# Patient Record
Sex: FEMALE | Race: BLACK OR AFRICAN AMERICAN | NOT HISPANIC OR LATINO | Employment: UNEMPLOYED | RURAL
[De-identification: names, ages, dates, MRNs, and addresses within clinical notes are randomized per-mention and may not be internally consistent; named-entity substitution may affect disease eponyms.]

---

## 2017-12-13 ENCOUNTER — HISTORICAL (OUTPATIENT)
Dept: ADMINISTRATIVE | Facility: HOSPITAL | Age: 57
End: 2017-12-13

## 2017-12-14 LAB
LAB AP CLINICAL INFORMATION: NORMAL
LAB AP DIAGNOSIS - HISTORICAL: NORMAL
LAB AP GROSS PATHOLOGY - HISTORICAL: NORMAL
LAB AP SPECIMEN SUBMITTED - HISTORICAL: NORMAL

## 2018-01-09 ENCOUNTER — HISTORICAL (OUTPATIENT)
Dept: ADMINISTRATIVE | Facility: HOSPITAL | Age: 58
End: 2018-01-09

## 2020-06-02 ENCOUNTER — HISTORICAL (OUTPATIENT)
Dept: ADMINISTRATIVE | Facility: HOSPITAL | Age: 60
End: 2020-06-02

## 2020-06-05 LAB
LAB AP CLINICAL INFORMATION: NORMAL
LAB AP GENERAL CAT - HISTORICAL: NORMAL
LAB AP INTERPRETATION/RESULT - HISTORICAL: NEGATIVE
LAB AP SPECIMEN ADEQUACY - HISTORICAL: NORMAL
LAB AP SPECIMEN SUBMITTED - HISTORICAL: NORMAL

## 2022-02-24 DIAGNOSIS — M25.512 LEFT SHOULDER PAIN: Primary | ICD-10-CM

## 2022-02-28 ENCOUNTER — CLINICAL SUPPORT (OUTPATIENT)
Dept: REHABILITATION | Facility: HOSPITAL | Age: 62
End: 2022-02-28
Payer: COMMERCIAL

## 2022-02-28 DIAGNOSIS — M25.612 DECREASED RANGE OF MOTION OF LEFT SHOULDER: ICD-10-CM

## 2022-02-28 DIAGNOSIS — M25.512 LEFT SHOULDER PAIN: ICD-10-CM

## 2022-02-28 PROBLEM — M25.619 DECREASED RANGE OF MOTION (ROM) OF SHOULDER: Status: ACTIVE | Noted: 2022-02-28

## 2022-02-28 NOTE — PLAN OF CARE
Patient: Pamela Nichole   Clinic #: [id number]  Date of evaluation: 2022     Referring Physician: Malina Silva FN*  Diagnosis:   Encounter Diagnoses   Name Primary?    Left shoulder pain     Decreased range of motion of left shoulder      Referral Orders: Eval and Treat  Age: 61 y.o.  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     No current facility-administered medications for this visit.     Review of patient's allergies indicates:  Not on File  X-Rays/Tests: no current xray      Subjective:    Pain: chronic pain with L shoulder  During no work: 3/10  While workin/10  Sleeping: /a/10  Location of pain: L shoulder superior/posteriror shoulder     Iris's goals for therapy are: return to PLOF, pain free ROM; improve function.      Objective:  Sensation Test: occasional numberness; hypersensitive    Observation/Inspection   Left Right   Anatomic Symmetry normal normal   Bony normal normal   Suparspinatus atrophic normal   Infraspinatus normal normal   Rhomboid atrophic atrophic     Observation/Inspection:  scapular winging      Range of Motion:   Right: WNL  Left: neither limited as follows: (see measurements table below) nor WFL but is limited by arthritis     (L) UE (R) UE     AROM AROM Norm   Shoulder Flexion   0-180   Shoulder Flexion 133    Shoulder Abduction 125 w/ P! @ 3/10 WFL 0-180   Shoulder Extension 45 w/ P! @ 5/10 WFL 0-50   Shoulder Internal Rotation 80 w/ 6/10 WFL 0-90   Shoulder External Rotation 73 w/ P!! 8/10 WFL 0-90   Horz Shoulder Adduction NT WFL 0-40     ROM Comments:   Pt has pain with shoulder abduction and external  rotation    Painful Arc:   Patient demonstrates painful arc in shoulder flexion or abduction.  Through Abduction: Elevation 90 Degrees      Strength  Shoulder Flexion LUE: 2+/5   Shoulder Extension LUE: 2+/5   Shoulder Abduction LUE:  2+/5   Shoulder Adduction LUE:  2+/5   Internal Rotation LUE: 2+/5   External Rotation LUE: 2+/5     43# on L hand strength  50# on R hand    Special Tests:  Positive: Neer Impingement and Empty can test        Palpation: (for pain)     Positive: Infraspinatus Region, AC joint and Supraspinatus Region         Limitations of Functional Status:   Self Care: reaching overhead  Leisure: carrying things,     Treatment included: OT evaluation, the following exercises (HEP) were instructed and Iris was able to demonstrate them prior to the end of the session. HEP will include stretching and strengthening as the pt progresses         Assessment  This 61 y.o. female referred to Outpatient Occupational Therapy with diagnosis of   Encounter Diagnoses   Name Primary?    Left shoulder pain     Decreased range of motion of left shoulder     presents with limitations as described in problem list. Patient can benefit from Occupational Therapy services for Ultrasound, moist heat, PROM, AAROM, AROM, Theraputic exercises, joint mobs, ice, Theraband Ex, UBE and pulley ex in order to maximize painfree functional use of  left UE. . The following goals were discussed with the patient and she is in agreement with them as to be addressed in the treatment plan.     Problem List:   Decreased function of Left UE, Decreased ROM, Increased pain, Decreased strength, Scapular winging and Inability to perform leisure activiites    Goals to be met in 4 weeks:  1) Pt will be independent and report performing HEP to maximize (L) shoulder functional capacity.  2) Pt will increase shoulder AROM in all measured planes of motion by 10-15 degrees with shoulder flexion and abduction.  3) Pt to increase LUE strength to 3+/5 -  4/5 as demonstrated by ADL and leisure task performance and MMT.  4) Pt will decreased LUE pain from 8/10 with movement to 3/10  5) Pt will increase L hand strength by 2.5# for carrying items  6) Pt to decreased external rotation pain to 2/10 with movement.    Plan  Iris to be treated by Occupational Therapy 2 times per week for 4 weeks to achieve the established goals.   Treatment to include Ultrasoud @ 3mHz, AAROM Mobilization of GH joint, PROM Home program, Ice, Moist heat, Strengthening Theraband Ex, UBE  and E- stim as well as any other treatments deemed necessary based on the patient's needs or progress.         I certify the need for these services furnished under this plan of treatment and while under my care    ____________________________________  Physician/Referring Practitioner    _______________  Date of Signature

## 2022-03-07 ENCOUNTER — CLINICAL SUPPORT (OUTPATIENT)
Dept: REHABILITATION | Facility: HOSPITAL | Age: 62
End: 2022-03-07
Payer: COMMERCIAL

## 2022-03-07 DIAGNOSIS — M25.612 DECREASED RANGE OF MOTION OF LEFT SHOULDER: ICD-10-CM

## 2022-03-07 DIAGNOSIS — M25.512 ACUTE PAIN OF LEFT SHOULDER: ICD-10-CM

## 2022-03-07 PROCEDURE — 97110 THERAPEUTIC EXERCISES: CPT

## 2022-03-07 PROCEDURE — 97530 THERAPEUTIC ACTIVITIES: CPT

## 2022-03-07 NOTE — PROGRESS NOTES
Occupational Therapy Progress Note    Subjective   Pt tolerated skilled OT well and was given therapeutic rest breaks as needed. Pt reported mild pain during her first session however this improved as time progressed.       Objective     Current condition: L shoulder pain  Measurements/Tests: no measurement taken at this time.    Assessment     Summary/Analysis of evaluation: Pt tolerated skilled OT well as therapy progressed. Pt initially was stiff but with ROM and pain improved as therapy progressed. Pt was educated on the importance of scaplohumeral rhythm. Pt session was at a mild to moderate intensity to adhere to pt's pain and tolerance. Overall the pt tolerated this session well and was given rest breaks as needed. Pt reported mild pain but states that her stiffness had improved by the end of the session. Skilled OT should be continued at this time.         Plan     TREATMENT  Therapeutic activity:     Pt [id number] mins on arm ergometer to increase endurance, BUE strength and activity tolerance for ADL performance    Pt completed 10 mins on BUE shoulder pulleys to increase activity tolerance, BUE strength and shoulder ROM to increase functional reaching for ADL performance.    Therapeutic exercise:    While sitting on the edge of the mat the pt completed the follow therapeutic exercises to enhance UB strength/ROM/stability for ADL performance:     bicep curls 2 sets of 20 reps  With 2# DB  external rotation 2 sets to 20 reps with 2#DB (OT's active assistance to end range  Scapular retraction: 1 set of 20 reps with 3 second holds at end range  Shoulder shrugs: 1 set of 20 reps with 3 second holds at end range                  Treatment time: therapeutic activity: 20; therapeutic exercise: 25  PLAN0                   Follow Up  Follow up in: next therapy visit

## 2022-03-09 ENCOUNTER — CLINICAL SUPPORT (OUTPATIENT)
Dept: REHABILITATION | Facility: HOSPITAL | Age: 62
End: 2022-03-09
Payer: COMMERCIAL

## 2022-03-09 DIAGNOSIS — M25.512 ACUTE PAIN OF LEFT SHOULDER: Primary | ICD-10-CM

## 2022-03-09 PROCEDURE — 97140 MANUAL THERAPY 1/> REGIONS: CPT

## 2022-03-09 PROCEDURE — 97530 THERAPEUTIC ACTIVITIES: CPT

## 2022-03-09 PROCEDURE — 97110 THERAPEUTIC EXERCISES: CPT

## 2022-03-09 NOTE — PROGRESS NOTES
Occupational Therapy Progress Note    Subjective   Pt tolerated skilled OT well and was given therapeutic rest breaks as needed. Pt reported mild pain at a 2-3/10 upon arrival   Current condition: L shoulder pain  Measurements/Tests: no measurement taken at this time.    Assessment     Summary/Analysis of evaluation: Pt tolerated skilled OT well as therapy progressed. Pt states her therapy session the other day help relieve pain afterwards. Pt continues to has some lateral shoulder pain which I attribute to joint capsule space. Pt tolerated manual intervention well and shows the motivation to get better. Pt continues to have limited exeternal rotation however the pain lessen when OT provide a medial scapular glide. Pt to continue skilled OT to achieve pain free ROM.         Plan     TREATMENT  Therapeutic activity:     Pt [id number] mins on arm ergometer to increase endurance, BUE strength and activity tolerance for ADL performance    Pt completed 10 mins on BUE shoulder pulleys with emphasis on shoulder flexion and abduction to increase activity tolerance, BUE strength and shoulder ROM to increase functional reaching for ADL performance.    Manual  While sidelying scapular mobilization were applied to the pt's LUE in posterior, superior and inferior direction to increase joint capsule space and joint tightness. Pt was also administered  ER side lying with medial scapular prompts for posture and shoulder stability.    Therapeutic exercise:    While sitting on the edge of the mat the pt completed the follow therapeutic exercises to enhance UB strength/ROM/stability for ADL performance:     bicep curls 2 sets of 20 reps  With 3# DB  external rotation 2 sets to 20 reps with 2#DB (OT's active assistance to end range)  Scapular retraction: 2 set of 20 reps with 3 second holds at end range  Shoulder shrugs: 1 set of 25 reps with 2 second holds at end range                  Treatment time: therapeutic activity: 20;  therapeutic exercise: 15; manual: 10  PLAN0                   Follow Up  Follow up in: next therapy visit

## 2022-03-16 ENCOUNTER — CLINICAL SUPPORT (OUTPATIENT)
Dept: REHABILITATION | Facility: HOSPITAL | Age: 62
End: 2022-03-16
Payer: COMMERCIAL

## 2022-03-16 DIAGNOSIS — M25.512 ACUTE PAIN OF LEFT SHOULDER: Primary | ICD-10-CM

## 2022-03-16 DIAGNOSIS — M25.612 DECREASED RANGE OF MOTION OF LEFT SHOULDER: ICD-10-CM

## 2022-03-16 PROCEDURE — 97530 THERAPEUTIC ACTIVITIES: CPT

## 2022-03-16 PROCEDURE — 97110 THERAPEUTIC EXERCISES: CPT

## 2022-03-16 NOTE — PROGRESS NOTES
Occupational Therapy Progress Note    Subjective   Pt tolerated skilled OT well and was given therapeutic rest breaks as needed. Pt reported mild pain at a 1-2/10 upon arrival   Current condition: L shoulder pain  Measurements/Tests: no measurement taken at this time.    Assessment     Summary/Analysis of evaluation: Pt tolerated skilled OT well as therapy progressed. Pt reports that she has not experienced pain until today. Pt participated well with skilled OT and was given rest breaks as needed. Pt continues to have a good therapy prognosis therefore skilled OT should be continued.     Plan     TREATMENT  Therapeutic activity:     Pt [id number] mins on arm ergometer to increase endurance, BUE strength and activity tolerance for ADL performance    Pt completed 10 mins on BUE shoulder pulleys with 2# DB emphasis on shoulder flexion and abduction to increase activity tolerance, BUE strength and shoulder ROM to increase functional reaching for ADL performance.      Therapeutic exercise:    While sitting on the edge of the mat the pt completed the follow therapeutic exercises to enhance UB strength/ROM/stability for ADL performance:    bicep curls 2 sets of 20 reps  with 3# DB  external rotation 2 sets to 20 reps with 2#DB (OT's active assistance to end range)  Scapular retraction: 2 set of 20 reps with 3 second holds at end range using red theraband  Shoulder shrugs: 2 set of 20 reps with 2 second holds at end range                  Treatment time: therapeutic activity: 20; therapeutic exercise: 25;   PLAN0                   Follow Up  Follow up in: next therapy visit

## 2022-03-18 ENCOUNTER — CLINICAL SUPPORT (OUTPATIENT)
Dept: REHABILITATION | Facility: HOSPITAL | Age: 62
End: 2022-03-18
Payer: COMMERCIAL

## 2022-03-18 DIAGNOSIS — M25.612 DECREASED RANGE OF MOTION OF LEFT SHOULDER: ICD-10-CM

## 2022-03-18 PROCEDURE — 97110 THERAPEUTIC EXERCISES: CPT

## 2022-03-18 PROCEDURE — 97530 THERAPEUTIC ACTIVITIES: CPT

## 2022-03-18 NOTE — PROGRESS NOTES
Occupational Therapy Progress Note    Subjective   Pt tolerated skilled OT well and was given therapeutic rest breaks as needed. Pt reported mild pain at a 1-2/10 upon arrival   Current condition: L shoulder pain  Measurements/Tests: no measurement taken at this time.    Assessment     Summary/Analysis of evaluation: Pt tolerated skilled OT well as therapy progressed. Pt reports experienced mild pain but repotes Pt participated well with skilled OT and was given rest breaks as needed. Pt continues to have a good therapy prognosis therefore skilled OT should be continued.     Plan     TREATMENT  Therapeutic activity:     Pt [id number] mins on arm ergometer to increase endurance, BUE strength and activity tolerance for ADL performance    Pt completed 10 mins on BUE shoulder pulleys with 2# DB emphasis on shoulder flexion and abduction to increase activity tolerance, BUE strength and shoulder ROM to increase functional reaching for ADL performance.      Therapeutic exercise:    While sitting on the edge of the mat the pt completed the follow therapeutic exercises to enhance UB strength/ROM/stability for ADL performance:    bicep curls 2 sets of 20 reps  with 3# DB  external rotation 2 sets to 20 reps with 2#DB (OT's active assistance to end range)  Scapular retraction: 2 set of 20 reps with 3 second holds at end range using red theraband  Shoulder shrugs: 2 set of 20 reps with 2 second holds at end range    Manual:  While sidelying the scapular mobilization were applied to the pt's LUE in an anterior, posterior and inferior direction to increase joint capsule space and joint tightness. Pt was also administered and sidelying ER.                 Treatment time: therapeutic activity: 20; therapeutic exercise: 25;   PLAN0                   Follow Up  Follow up in: next therapy visit

## 2022-03-21 ENCOUNTER — CLINICAL SUPPORT (OUTPATIENT)
Dept: REHABILITATION | Facility: HOSPITAL | Age: 62
End: 2022-03-21
Payer: COMMERCIAL

## 2022-03-21 DIAGNOSIS — M25.512 ACUTE PAIN OF LEFT SHOULDER: Primary | ICD-10-CM

## 2022-03-21 PROCEDURE — 97110 THERAPEUTIC EXERCISES: CPT

## 2022-03-21 PROCEDURE — 97530 THERAPEUTIC ACTIVITIES: CPT

## 2022-03-21 NOTE — PROGRESS NOTES
Occupational Therapy Progress Note    Subjective   Pt tolerated skilled OT well and was given therapeutic rest breaks as needed. Pt reported mild pain at a 1-2/10 with movement   Current condition: L shoulder pain  Measurements/Tests: no measurement taken at this time.    Assessment     Summary/Analysis of evaluation: Pt tolerated skilled OT well as therapy progressed. Pt reports compliance with HEP and reports little to no pain at this time. Pt participated well with skilled OT and was given rest breaks as needed. Pt is progressing well with therapy.      Plan     TREATMENT  Therapeutic activity:     Pt [id number] mins on arm ergometer to increase endurance, BUE strength and activity tolerance for ADL performance    Pt completed 10 mins on BUE shoulder pulleys with 2# DB emphasis on shoulder flexion and abduction to increase activity tolerance, BUE strength and shoulder ROM to increase functional reaching for ADL performance.      Therapeutic exercise:    While sitting on the edge of the mat the pt completed the follow therapeutic exercises to enhance UB strength/ROM/stability for ADL performance:    Shoulder abduction 2 sets of 20 reps  with 1# DB  external rotation 2 sets to 20 reps with 2#DB (OT's active assistance to end range)  Scapular retraction: 2 set of 20 reps with 3 second holds at end range using green theraband  Shoulder shrugs: 2 set of 20 reps with 2 second holds at end range          Treatment time: therapeutic activity: 20; therapeutic exercise: 25;   PLAN0                   Follow Up  Follow up in: next therapy visit

## 2022-03-23 ENCOUNTER — CLINICAL SUPPORT (OUTPATIENT)
Dept: REHABILITATION | Facility: HOSPITAL | Age: 62
End: 2022-03-23
Payer: COMMERCIAL

## 2022-03-23 DIAGNOSIS — M25.512 ACUTE PAIN OF LEFT SHOULDER: Primary | ICD-10-CM

## 2022-03-23 PROCEDURE — 97110 THERAPEUTIC EXERCISES: CPT

## 2022-03-23 PROCEDURE — 97530 THERAPEUTIC ACTIVITIES: CPT

## 2022-03-23 NOTE — PROGRESS NOTES
Occupational Therapy Progress Note    Subjective   Pt tolerated skilled OT well and was given therapeutic rest breaks as needed. Pt reported mild pain at a 0/10 with movement. Pt reports feeling better on today.    Current condition: L shoulder pain    Measurements/Tests: no measurement taken at this time.   (L) UE (R) UE       AROM AROM Norm   Shoulder Flexion     0-180   Shoulder Flexion 133    Shoulder Abduction 125 w/ P! @ 3/10 WFL 0-180   Shoulder Extension 45 w/ P! @ 5/10 WFL 0-50   Shoulder Internal Rotation 80 w/ 6/10 WFL 0-90   Shoulder External Rotation 73 w/ P!! 8/10 WFL 0-90   Horz Shoulder Adduction NT WFL 0-40      43# on L hand strength  50# on R hand    Measurements/Tests: no measurement taken at this time.   (L) UE (R) UE       AROM AROM Norm   Shoulder Flexion     0-180   Shoulder Flexion 160    Shoulder Abduction 170 w/ P! @ 1/10 WFL 0-180   Shoulder Extension 70 w/ P! @ 2-3/10 WFL 0-50   Shoulder Internal Rotation 90 w/ 6/10 WFL 0-90   Shoulder External Rotation 90  0/10 WFL 0-90   Horz Shoulder Adduction NT WFL 0-40      43# on L hand strength  50# on R hand     Goals to be met in 4 weeks:  1) Pt will be independent and report performing HEP to maximize (L) shoulder functional capacity.-Goal met  2) Pt will increase shoulder AROM in all measured planes of motion by 10-15 degrees with shoulder flexion and abduction.-Goal progressing  3) Pt to increase LUE strength to 3+/5 - 4/5 as demonstrated by ADL and leisure task performance and MMT.-Goal progressing  4) Pt will decreased LUE pain from 8/10 with movement to 3/10- Goal Met  5) Pt will increase L hand strength by 2.5# for carrying items-Goal progressing  6) Pt to decreased external rotation pain to 2/10 with movement-Goal progressing        Assessment     Summary/Analysis of evaluation: Pt tolerated skilled OT well as therapy progressed. Pt reports compliance with HEP and reports little to no pain at this time. Pt participated  well with skilled OT and was given rest breaks as needed. Pt is progressing well with therapy. Pt attributes pain with movement to posterior tightness. Pt's overall ROM and improved and her pain has improved as well. Pt is progressing well and skilled OT should be continued.       Plan     TREATMENT  Therapeutic activity:     Pt [id number] mins on arm ergometer to increase endurance, BUE strength and activity tolerance for ADL performance    Pt completed 10 mins on BUE shoulder pulleys with 2# DB emphasis on shoulder flexion and abduction to increase activity tolerance, BUE strength and shoulder ROM to increase functional reaching for ADL performance.      Therapeutic exercise:    While sitting on the edge of the mat the pt completed the follow therapeutic exercises to enhance UB strength/ROM/stability for ADL performance:    Shoulder abduction 2 sets of 20 reps  with 1# DB  external rotation 2 sets to 20 reps with 2#DB (OT's active assistance to end range)  Scapular retraction: 2 set of 15 reps with 3 second holds at end range using green theraband  Shoulder shrugs: 2 set of 20 reps with 2 second holds at end range  Bicep curls: 2 sets of 20 reps with 4# DB          Treatment time: therapeutic activity: 20; therapeutic exercise: 25;   PLAN0                   Follow Up  Follow up in: next therapy visit

## 2022-03-25 NOTE — PROGRESS NOTES
OT progress note      Pt is progressing well with skilled OT. OT should be continued at this time.      Measurements/Tests: no measurement taken at this time.   (L) UE (R) UE       AROM AROM Norm   Shoulder Flexion     0-180   Shoulder Flexion 133    Shoulder Abduction 125 w/ P! @ 3/10 WFL 0-180   Shoulder Extension 45 w/ P! @ 5/10 WFL 0-50   Shoulder Internal Rotation 80 w/ 6/10 WFL 0-90   Shoulder External Rotation 73 w/ P!! 8/10 WFL 0-90   Horz Shoulder Adduction NT WFL 0-40      43# on L hand strength  50# on R hand    Measurements/Tests: no measurement taken at this time.   (L) UE (R) UE       AROM AROM Norm   Shoulder Flexion     0-180   Shoulder Flexion 160    Shoulder Abduction 170 w/ P! @ 1/10 WFL 0-180   Shoulder Extension 70 w/ P! @ 2-3/10 WFL 0-50   Shoulder Internal Rotation 90 w/ 6/10 WFL 0-90   Shoulder External Rotation 90  0/10 WFL 0-90   Horz Shoulder Adduction NT WFL 0-40      43# on L hand strength  50# on R hand     Goals to be met in 4 weeks:  1) Pt will be independent and report performing HEP to maximize (L) shoulder functional capacity.-Goal met  2) Pt will increase shoulder AROM in all measured planes of motion by 10-15 degrees with shoulder flexion and abduction.-Goal progressing  3) Pt to increase LUE strength to 3+/5 - 4/5 as demonstrated by ADL and leisure task performance and MMT.-Goal progressing  4) Pt will decreased LUE pain from 8/10 with movement to 3/10- Goal Met  5) Pt will increase L hand strength by 2.5# for carrying items-Goal progressing  6) Pt to decreased external rotation pain to 2/10 with movement-Goal progressing

## 2022-03-28 ENCOUNTER — CLINICAL SUPPORT (OUTPATIENT)
Dept: REHABILITATION | Facility: HOSPITAL | Age: 62
End: 2022-03-28
Payer: COMMERCIAL

## 2022-03-28 DIAGNOSIS — M25.512 ACUTE PAIN OF LEFT SHOULDER: Primary | ICD-10-CM

## 2022-03-28 PROCEDURE — 97110 THERAPEUTIC EXERCISES: CPT

## 2022-03-28 PROCEDURE — 97140 MANUAL THERAPY 1/> REGIONS: CPT

## 2022-03-28 PROCEDURE — 97530 THERAPEUTIC ACTIVITIES: CPT

## 2022-03-28 NOTE — PROGRESS NOTES
Occupational Therapy Progress Note    Subjective   Pt tolerated skilled OT well and was given therapeutic rest breaks as needed. Pt reported no pain at a 0/10 with movement. Pt reports feeling better on today.    Current condition: L shoulder pain    Measurements/Tests: no measurement taken at this time.   (L) UE (R) UE       AROM AROM Norm   Shoulder Flexion     0-180   Shoulder Flexion 133    Shoulder Abduction 125 w/ P! @ 3/10 WFL 0-180   Shoulder Extension 45 w/ P! @ 5/10 WFL 0-50   Shoulder Internal Rotation 80 w/ 6/10 WFL 0-90   Shoulder External Rotation 73 w/ P!! 8/10 WFL 0-90   Horz Shoulder Adduction NT WFL 0-40      43# on L hand strength  50# on R hand    Measurements/Tests: no measurement taken at this time.   (L) UE (R) UE       AROM AROM Norm   Shoulder Flexion     0-180   Shoulder Flexion 160    Shoulder Abduction 170 w/ P! @ 1/10 WFL 0-180   Shoulder Extension 70 w/ P! @ 2-3/10 WFL 0-50   Shoulder Internal Rotation 90 w/ 6/10 WFL 0-90   Shoulder External Rotation 90  0/10 WFL 0-90   Horz Shoulder Adduction NT WFL 0-40      43# on L hand strength  50# on R hand           Assessment     Summary/Analysis of evaluation: Pt tolerated skilled OT well. Pt reported that her pain has increased and that she has better function within her L shoulder. Pt reports pain free movement and better stamina. Pt reports compliance with her HEP.    Plan     TREATMENT  Therapeutic activity:     Pt [id number] mins on arm ergometer to increase endurance, BUE strength and activity tolerance for ADL performance    Pt completed 10 mins on BUE shoulder pulleys with 2# DB emphasis on shoulder flexion and abduction to increase activity tolerance, BUE strength and shoulder ROM to increase functional reaching for ADL performance.      Therapeutic exercise:    While sitting on the edge of the mat the pt completed the follow therapeutic exercises to enhance UB strength/ROM/stability for ADL performance:      Shoulder  flexion with dowel: 2 sets of 20 reps with 3# dowel  external rotation 2 sets to 20 reps with 2#DB (OT's active assistance to end range)  Scapular retraction: 2 set of 12 reps with 3 second holds at end range using blue theraband    Manual  While sidelying the scapular mobilization were applied to the pt's LUE in an anterior, posterior and inferior direction to increase joint capsule space and joint tightness. Pt was also administered shoulder abduction/flexion stretches, supine ER and IR and sidelying ER.             Treatment time: therapeutic activity: 25; therapeutic exercise: 20; manual: 15  PLAN0                   Follow Up  Follow up in: next therapy visit

## 2022-04-04 ENCOUNTER — CLINICAL SUPPORT (OUTPATIENT)
Dept: REHABILITATION | Facility: HOSPITAL | Age: 62
End: 2022-04-04
Payer: COMMERCIAL

## 2022-04-04 DIAGNOSIS — M25.512 ACUTE PAIN OF LEFT SHOULDER: Primary | ICD-10-CM

## 2022-04-04 PROCEDURE — 97110 THERAPEUTIC EXERCISES: CPT

## 2022-04-04 PROCEDURE — 97530 THERAPEUTIC ACTIVITIES: CPT

## 2022-04-04 NOTE — PROGRESS NOTES
Occupational Therapy Progress Note    Subjective   Pt tolerated skilled OT well and was given therapeutic rest breaks as needed. Pt reported no pain at a 0/10 with movement. Pt reports feeling better on today.    Current condition: L shoulder pain    Measurements/Tests: no measurement taken at this time.   (L) UE (R) UE       AROM AROM Norm   Shoulder Flexion     0-180   Shoulder Flexion 160    Shoulder Abduction 170  WFL 0-180   Shoulder Extension 75 w WFL 0-50   Shoulder Internal Rotation 80 w/ 6/10 WFL 0-90   Shoulder External Rotation 73 w/ P!! 8/10 WFL 0-90   Horz Shoulder Adduction NT WFL 0-40              Assessment     Summary/Analysis of evaluation: Pt tolerated skilled OT well. Pt reported that her overall function and strength has improved.  Pt reports compliance with her HEP. Pt is improving with strength and ROM. Pt will soon be d/c home with HEP  Plan     TREATMENT  Therapeutic activity:     Pt [id number] mins on arm ergometer to increase endurance, BUE strength and activity tolerance for ADL performance    Pt completed 10 mins on BUE shoulder pulleys with 2# DB emphasis on shoulder flexion and abduction to increase activity tolerance, BUE strength and shoulder ROM to increase functional reaching for ADL performance.      Therapeutic exercise:    While sitting on the edge of the mat the pt completed the follow therapeutic exercises to enhance UB strength/ROM/stability for ADL performance:      Shoulder flexion with dowel: 2 sets of 20 reps with 3# dowel  external rotation 2 sets to 20 reps with 5#DB   Bicep curls: 2 sets of 20 reps with 5# DB to increase overall strength.   Scapular retraction: 2 set of 12 reps with 3 second holds at end range using blue theraband  AA shoulder rhomboid squeezes with OT assistance 2 sets of 5 reps to encourage optimal shoulder position              Treatment time: therapeutic activity: 25; therapeutic exercise: 20; manual: 15  PLAN0                   Follow  Up  Follow up in: next therapy visit

## 2022-04-06 ENCOUNTER — CLINICAL SUPPORT (OUTPATIENT)
Dept: REHABILITATION | Facility: HOSPITAL | Age: 62
End: 2022-04-06
Payer: COMMERCIAL

## 2022-04-06 PROCEDURE — 97530 THERAPEUTIC ACTIVITIES: CPT

## 2022-04-06 PROCEDURE — 97110 THERAPEUTIC EXERCISES: CPT

## 2022-04-06 NOTE — PROGRESS NOTES
Subjective     Pt goals met and she will be d/c on today.  Patient's response to therapy: was good as she tolerated skilled OT and completes her HEP.       Objective     Current condition:  L shoulder pain  Measurements/Tests:   On 4/6/22- 10 th visit       (L) UE (R) UE       AROM AROM Norm   Shoulder Flexion     0-180   Shoulder Flexion 175    Shoulder Abduction 180 WFL 0-180   Shoulder Extension 75 WFL 0-50   Shoulder Internal Rotation 80 w/ 6/10 WFL 0-90   Shoulder External Rotation 90 WFL 0-90   Horz Shoulder Adduction    NT WFL 0-40         45# on L hand strength  50# on R hand     Goals to be met in 4 weeks:  1) Pt will be independent and report performing HEP to maximize (L) shoulder functional capacity.-GOAL MET  2) Pt will increase shoulder AROM in all measured planes of motion by 10-15 degrees with shoulder flexion and abduction.GOAL MET  3) Pt to increase LUE strength to 3+/5 - 4/5 as demonstrated by ADL and leisure task performance and MMT.GOAL MET  4) Pt will decreased LUE pain from 8/10 with movement to 3/10-GOAL MET  5) Pt will increase L hand strength by 2.5# for carrying items- 50# on L hand strength  50# on R hand-GOAL MET  6) Pt to decreased external rotation pain to 2/10 with movement.-GOAL MET            Assessment     Summary/analysis of evaluation:   Progress toward previous goals: GOALs met      Plan     TREATMENT       PLAN                Recommendations: Do not initiate OT services        Reason for plan status: Patient achieved goals        Plan of care: Continue with current home exercise program as instructed     Follow Up  Follow up in: d/c

## 2022-04-06 NOTE — PROGRESS NOTES
Occupational Therapy Progress Note    Subjective   Pt tolerated skilled OT well and was given therapeutic rest breaks as needed. Pt reported no pain at a 0/10 with movement. Pt states that she is back at her baseline      Measurements/Tests: no measurement taken at this time.  On eval   (L) UE (R) UE       AROM AROM Norm   Shoulder Flexion     0-180   Shoulder Flexion 133    Shoulder Abduction 125 w/ P! @ 3/10 WFL 0-180   Shoulder Extension 45 w/ P! @ 5/10 WFL 0-50   Shoulder Internal Rotation 80 w/ no pain WFL 0-90   Shoulder External Rotation 73 w/ P!! 8/10 WFL 0-90   Horz Shoulder Adduction NT WFL 0-40    43# on L hand strength  50# on R hand    On 4/6/22- 10 th visit     (L) UE (R) UE       AROM AROM Norm   Shoulder Flexion     0-180   Shoulder Flexion 175    Shoulder Abduction 180 WFL 0-180   Shoulder Extension 75 WFL 0-50   Shoulder Internal Rotation 80 w/ 6/10 WFL 0-90   Shoulder External Rotation 90 WFL 0-90   Horz Shoulder Adduction   NT WFL 0-40        45# on L hand strength  50# on R hand    Goals to be met in 4 weeks:  1) Pt will be independent and report performing HEP to maximize (L) shoulder functional capacity.-GOAL MET  2) Pt will increase shoulder AROM in all measured planes of motion by 10-15 degrees with shoulder flexion and abduction.GOAL MET  3) Pt to increase LUE strength to 3+/5 - 4/5 as demonstrated by ADL and leisure task performance and MMT.GOAL MET  4) Pt will decreased LUE pain from 8/10 with movement to 3/10-GOAL MET  5) Pt will increase L hand strength by 2.5# for carrying items- 50# on L hand strength  50# on R hand-GOAL MET  6) Pt to decreased external rotation pain to 2/10 with movement.-GOAL MET      Assessment     Summary/Analysis of evaluation: Pt tolerated skilled OT well. Pt reported that her overall function and strength has improved.  Pt reports compliance with her HEP. Pt is improving with strength and ROM. Pt will be d/c home with HEP as she has reached her  therapy goals      Plan     TREATMENT  Therapeutic activity:     Pt [id number] mins on arm ergometer to increase endurance, BUE strength and activity tolerance for ADL performance    Pt was educated on HEP, current level of function, POC, goals, d/c plan and OP measurement.      Therapeutic exercise:    While sitting on the edge of the mat the pt completed the follow therapeutic exercises to enhance UB strength/ROM/stability for ADL performance:      Shoulder flexion with dowel: 2 sets of 20 reps with 3# dowel  external rotation 2 sets to 20 reps with 5#DB   Bicep curls: 2 sets of 15 reps with 5# DB to increase overall strength.   Scapular retraction: 2 set of 12 reps with 3 second holds at end range using blue theraband              Treatment time: therapeutic activity: 30; therapeutic exercise: 23; :   PLAN0                   Follow Up  Follow up in: next therapy visit